# Patient Record
Sex: FEMALE | Race: WHITE | NOT HISPANIC OR LATINO | Employment: UNEMPLOYED | URBAN - METROPOLITAN AREA
[De-identification: names, ages, dates, MRNs, and addresses within clinical notes are randomized per-mention and may not be internally consistent; named-entity substitution may affect disease eponyms.]

---

## 2019-06-16 ENCOUNTER — HOSPITAL ENCOUNTER (EMERGENCY)
Facility: HOSPITAL | Age: 34
Discharge: HOME | End: 2019-06-16
Attending: EMERGENCY MEDICINE
Payer: MEDICAID

## 2019-06-16 VITALS
TEMPERATURE: 98 F | HEIGHT: 66 IN | WEIGHT: 145 LBS | OXYGEN SATURATION: 99 % | HEART RATE: 96 BPM | SYSTOLIC BLOOD PRESSURE: 112 MMHG | DIASTOLIC BLOOD PRESSURE: 85 MMHG | RESPIRATION RATE: 18 BRPM | BODY MASS INDEX: 23.3 KG/M2

## 2019-06-16 DIAGNOSIS — F41.9 ACUTE ANXIETY: Primary | ICD-10-CM

## 2019-06-16 PROCEDURE — 99283 EMERGENCY DEPT VISIT LOW MDM: CPT

## 2019-06-16 PROCEDURE — 25000003 PHARM REV CODE 250: Performed by: EMERGENCY MEDICINE

## 2019-06-16 RX ORDER — LORAZEPAM 1 MG/1
1 TABLET ORAL
Status: COMPLETED | OUTPATIENT
Start: 2019-06-16 | End: 2019-06-16

## 2019-06-16 RX ADMIN — LORAZEPAM 1 MG: 1 TABLET ORAL at 07:06

## 2019-06-16 NOTE — ED NOTES
Pt states her friend is almost here to pick her up.  She is resting now and denies complaint at this time.

## 2019-06-16 NOTE — ED NOTES
Wet clothing removed, placed in gown, warm blankets provided, po fluid provided per request of being thirsty

## 2019-06-16 NOTE — ED NOTES
Attempted to contact patient's mother (Cyn Rivera) at the patients request. No answer at this time.   [phone]   Dr. Amy Kaye reviewed notes and allergies (succinylcholine), okay to proceed with procedure.
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
## 2019-06-16 NOTE — DISCHARGE INSTRUCTIONS
Ativan 1mg by mouth   Resume regular medications and obtain follow up with mental health counseling as soon as possible .

## 2019-06-16 NOTE — ED NOTES
Pt given d/c instructions and she verbalized understanding.  Pt escorted to registration with no obvious distress noted.
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
